# Patient Record
Sex: MALE
[De-identification: names, ages, dates, MRNs, and addresses within clinical notes are randomized per-mention and may not be internally consistent; named-entity substitution may affect disease eponyms.]

---

## 2020-04-26 ENCOUNTER — MESSAGE (OUTPATIENT)
Age: 35
End: 2020-04-26

## 2022-10-24 PROBLEM — Z00.00 ENCOUNTER FOR PREVENTIVE HEALTH EXAMINATION: Status: ACTIVE | Noted: 2022-10-24

## 2022-10-25 ENCOUNTER — NON-APPOINTMENT (OUTPATIENT)
Age: 37
End: 2022-10-25

## 2022-10-25 ENCOUNTER — APPOINTMENT (OUTPATIENT)
Dept: OTOLARYNGOLOGY | Facility: CLINIC | Age: 37
End: 2022-10-25

## 2022-10-25 VITALS
HEIGHT: 67 IN | BODY MASS INDEX: 28.25 KG/M2 | SYSTOLIC BLOOD PRESSURE: 129 MMHG | HEART RATE: 68 BPM | DIASTOLIC BLOOD PRESSURE: 82 MMHG | WEIGHT: 180 LBS | TEMPERATURE: 98 F

## 2022-10-25 VITALS — HEIGHT: 60 IN | TEMPERATURE: 97.8 F | WEIGHT: 75 LBS | BODY MASS INDEX: 14.72 KG/M2

## 2022-10-25 DIAGNOSIS — K13.70 UNSPECIFIED LESIONS OF ORAL MUCOSA: ICD-10-CM

## 2022-10-25 DIAGNOSIS — H61.22 IMPACTED CERUMEN, LEFT EAR: ICD-10-CM

## 2022-10-25 PROCEDURE — 99204 OFFICE O/P NEW MOD 45 MIN: CPT | Mod: 25

## 2022-10-25 PROCEDURE — 69210 REMOVE IMPACTED EAR WAX UNI: CPT

## 2022-10-25 RX ORDER — OFLOXACIN OTIC 3 MG/ML
0.3 SOLUTION AURICULAR (OTIC) TWICE DAILY
Qty: 1 | Refills: 0 | Status: ACTIVE | COMMUNITY
Start: 2022-10-25 | End: 1900-01-01

## 2022-10-25 RX ORDER — PREDNISOLONE ACETATE 10 MG/ML
1 SUSPENSION/ DROPS OPHTHALMIC TWICE DAILY
Qty: 1 | Refills: 0 | Status: ACTIVE | COMMUNITY
Start: 2022-10-25 | End: 1900-01-01

## 2022-10-25 RX ORDER — CIPROFLOXACIN AND DEXAMETHASONE 3; 1 MG/ML; MG/ML
0.3-0.1 SUSPENSION/ DROPS AURICULAR (OTIC) TWICE DAILY
Qty: 1 | Refills: 0 | Status: ACTIVE | COMMUNITY
Start: 2022-10-25 | End: 1900-01-01

## 2022-10-25 RX ORDER — TRIAMCINOLONE ACETONIDE 1 MG/G
0.1 PASTE DENTAL TWICE DAILY
Qty: 1 | Refills: 0 | Status: ACTIVE | COMMUNITY
Start: 2022-10-25 | End: 1900-01-01

## 2022-10-25 NOTE — PROCEDURE
[FreeTextEntry2] : cerumen impaction\par  [FreeTextEntry3] : After informed verbal consent is obtained, cerumen is removed from the left ear canal with a curette & suction. Pt tolerated well, no residual ear canal irritation. \par \par \par

## 2022-10-25 NOTE — ASSESSMENT
[FreeTextEntry1] : Mr. HAHN is a 37 year male with L OME. Cerumen and purulence removed from L side with suction and debrided\par \par - Ciprodex twice daily x 1 week\par - f.u 2 weeks eval TM and obtain audio\par - c/w post viral lesions on tongue - will send orabase to apply

## 2022-10-25 NOTE — END OF VISIT
[FreeTextEntry3] : I personally saw and examined MERRILL HAHN in detail.  I spoke to YOBANY Galvan regarding the assessment and plan of care. I performed the procedures and relevant physical exam.  I have reviewed the above assessment and plan of care and I agree.  I have made changes to the body of the note wherever necessary and appropriate.\par

## 2022-10-25 NOTE — HISTORY OF PRESENT ILLNESS
[de-identified] : Mr. HAHN is a 37 year male L ear clogging which started over the summer used decongestants and it resolved, now 2 weeks ago swam and has clogging, using flonase daily and afrin x 3 days, as well as allegra\par denies tinnitus, discharge or otalgia\par denies sinus pressure, rhinorrhea, nasal obstruction\par \par

## 2022-10-25 NOTE — PHYSICAL EXAM
[Normal] : normal appearance, well groomed, well nourished, and in no acute distress [de-identified] : L cerumen / purulence / polyp [de-identified] : R wnl / L unable to visualize due to polyp

## 2022-10-31 ENCOUNTER — NON-APPOINTMENT (OUTPATIENT)
Age: 37
End: 2022-10-31

## 2022-11-02 ENCOUNTER — APPOINTMENT (OUTPATIENT)
Dept: OTOLARYNGOLOGY | Facility: CLINIC | Age: 37
End: 2022-11-02

## 2022-11-02 VITALS
DIASTOLIC BLOOD PRESSURE: 80 MMHG | WEIGHT: 180 LBS | SYSTOLIC BLOOD PRESSURE: 126 MMHG | TEMPERATURE: 97.5 F | HEART RATE: 70 BPM | BODY MASS INDEX: 28.25 KG/M2 | HEIGHT: 67 IN

## 2022-11-02 DIAGNOSIS — H93.8X2 OTHER SPECIFIED DISORDERS OF LEFT EAR: ICD-10-CM

## 2022-11-02 DIAGNOSIS — H66.92 OTITIS MEDIA, UNSPECIFIED, LEFT EAR: ICD-10-CM

## 2022-11-02 PROCEDURE — 99214 OFFICE O/P EST MOD 30 MIN: CPT | Mod: 25

## 2022-11-02 PROCEDURE — 69210 REMOVE IMPACTED EAR WAX UNI: CPT

## 2022-11-02 NOTE — PROCEDURE
[FreeTextEntry3] : After informed verbal consent is obtained, cerumen, debris and aural polyp removed from the left ear canal with a curette & suction. Pt tolerated well, no residual ear canal irritation. \par \par

## 2022-11-02 NOTE — PHYSICAL EXAM
[Normal] : tympanic membranes are normal in both ears [de-identified] : L cerumen / purulence / polyp [de-identified] : R wnl / L unable to visualize due to polyp [de-identified] : midline hard palate with erythematous area 1.2 cm, midline

## 2022-11-02 NOTE — ASSESSMENT
[FreeTextEntry1] : Mr. HAHN is a 37 year male with L OME using Ciprodex day 9/14 with persistent clogging L side. On exam cerumen and debris removed from L side with suction, aural polyp  removed as well. All symptoms resolved after debridement\par \par - Audio deferred as symptoms resolved\par - continue Ciprodex gtts x 3 more days L ear\par - water precautions with rubbing alcohol rec for after swimming\par - f/u annually for cerumen eval\par \par - post viral lesions on tongue resolved with Triamcinolone paste\par \par - possibly with trauma to hard palate, apply triamcinolone paste, f/up if not resolving with this.